# Patient Record
Sex: FEMALE
[De-identification: names, ages, dates, MRNs, and addresses within clinical notes are randomized per-mention and may not be internally consistent; named-entity substitution may affect disease eponyms.]

---

## 2018-03-28 ENCOUNTER — HOSPITAL ENCOUNTER (OUTPATIENT)
Dept: HOSPITAL 88 - US | Age: 48
End: 2018-03-28
Attending: INTERNAL MEDICINE
Payer: COMMERCIAL

## 2018-03-28 DIAGNOSIS — I10: Primary | ICD-10-CM

## 2018-03-28 PROCEDURE — 76770 US EXAM ABDO BACK WALL COMP: CPT

## 2018-03-28 PROCEDURE — 93976 VASCULAR STUDY: CPT

## 2018-03-28 NOTE — DIAGNOSTIC IMAGING REPORT
PROCEDURE: RENAL DOPPLER ULTRASOUND

 

COMPARISON:None.

INDICATIONS:Hypertension

 

FINDINGS:

Multiple sagittal and axial images of the right and left kidneys were 

obtained.

 

RIGHT KIDNEY:

The right kidney measures 9.9 cm. The cortical thickness is 1.4 cm.

The right kidney has normal echogenicity. There are no masses, 

hydronephrosis or calculi.

 

The highest right main renal artery PSV is 83 cm/sec.

The highest right hilar artery PSV is 121 cm/sec.

The highest right arcuate artery PSV is 56.8 cm/sec.

 

LEFT KIDNEY:

The left kidney measures 9.4 cm. The cortical thickness is 1.8 cm.

The left kidney has normal echogenicity. There are no masses, 

hydronephrosis or calculi.

 

The highest left main renal artery PSV is 157 cm/sec.

The highest left hilar artery PSV is 127 cm/sec.

The highest left arcuate artery PSV is 69.3 cm/sec.

 

The abdominal aorta PSV is 95.8 cm/sec.

The right renal artery/aorta ratio is 1.3. 

The left renal artery/aorta ratio is 1.6.

 

The right and left renal veins are patent.

 

The bladder is unremarkable.

 

CONCLUSION:

 

No acute sonographic abnormality.

No sonographic evidence for renal artery stenosis.

 

Dictated by:  Delmer Holguin M.D. on 3/28/2018 at 12:06     

Electronically approved by:  Delmer Holguin M.D. on 3/28/2018 at 12:06

## 2018-03-28 NOTE — DIAGNOSTIC IMAGING REPORT
PROCEDURE:US RETROPERITONEAL ( KIDNEY ).

COMPARISON:None.

INDICATIONS:Hypertension

TECHNIQUE: Grey-scale and color sonographic images of the bilateral 

kidneys and bladder where obtained in transverse and longitudinal 

planes.

 

FINDINGS:

 

RIGHT KIDNEY: 9.4 cm, cortex 1.3 cm

Cysts: None.

Solid masses: None.

Stones: None.

Hydronephrosis: None.

Echogenicity: Normal.

 

LEFT KIDNEY: 9.9 cm, cortex 2.4 cm

Cysts: None.

Solid masses: None.

Stones: None.

Hydronephrosis: None.

Echogenicity: Normal.

 

Bladder: In the normal contour. Bilateral ureteral jets are visualized.

 

CONCLUSION:

No acute sonographic abnormality. 

 

Dictated by:  Delmer Holguin M.D. on 3/28/2018 at 10:52     

Electronically approved by:  Delmer Holguin M.D. on 3/28/2018 at 10:52